# Patient Record
Sex: FEMALE | Race: WHITE | Employment: STUDENT | URBAN - METROPOLITAN AREA
[De-identification: names, ages, dates, MRNs, and addresses within clinical notes are randomized per-mention and may not be internally consistent; named-entity substitution may affect disease eponyms.]

---

## 2018-05-02 ENCOUNTER — HOSPITAL ENCOUNTER (OUTPATIENT)
Age: 23
Discharge: HOME | End: 2018-05-02
Attending: FAMILY MEDICINE
Payer: COMMERCIAL

## 2018-05-02 ENCOUNTER — OFFICE VISIT (OUTPATIENT)
Dept: FAMILY MEDICINE CLINIC | Facility: CLINIC | Age: 23
End: 2018-05-02

## 2018-05-02 VITALS
RESPIRATION RATE: 16 BRPM | OXYGEN SATURATION: 98 % | WEIGHT: 169 LBS | BODY MASS INDEX: 24.2 KG/M2 | HEIGHT: 70 IN | TEMPERATURE: 98 F | HEART RATE: 76 BPM | DIASTOLIC BLOOD PRESSURE: 68 MMHG | SYSTOLIC BLOOD PRESSURE: 118 MMHG

## 2018-05-02 VITALS
DIASTOLIC BLOOD PRESSURE: 63 MMHG | RESPIRATION RATE: 18 BRPM | SYSTOLIC BLOOD PRESSURE: 109 MMHG | TEMPERATURE: 98 F | HEART RATE: 98 BPM | OXYGEN SATURATION: 98 %

## 2018-05-02 DIAGNOSIS — H16.002 CORNEAL ULCERATION, LEFT: Primary | ICD-10-CM

## 2018-05-02 DIAGNOSIS — Z02.9 ADMINISTRATIVE ENCOUNTER: Primary | ICD-10-CM

## 2018-05-02 DIAGNOSIS — H10.31 ACUTE BACTERIAL CONJUNCTIVITIS OF RIGHT EYE: ICD-10-CM

## 2018-05-02 PROCEDURE — 99213 OFFICE O/P EST LOW 20 MIN: CPT

## 2018-05-02 PROCEDURE — 99202 OFFICE O/P NEW SF 15 MIN: CPT

## 2018-05-02 RX ORDER — KETOROLAC TROMETHAMINE 4 MG/ML
1 SOLUTION/ DROPS OPHTHALMIC 4 TIMES DAILY
Qty: 1 BOTTLE | Refills: 0 | Status: SHIPPED | OUTPATIENT
Start: 2018-05-02 | End: 2018-05-09

## 2018-05-02 RX ORDER — TETRACAINE HYDROCHLORIDE 5 MG/ML
1 SOLUTION OPHTHALMIC ONCE
Status: COMPLETED | OUTPATIENT
Start: 2018-05-02 | End: 2018-05-02

## 2018-05-02 RX ORDER — POLYMYXIN B SULFATE AND TRIMETHOPRIM 1; 10000 MG/ML; [USP'U]/ML
1 SOLUTION OPHTHALMIC
Qty: 10 ML | Refills: 0 | Status: SHIPPED | OUTPATIENT
Start: 2018-05-02 | End: 2018-05-07

## 2018-05-02 NOTE — ED INITIAL ASSESSMENT (HPI)
Slept with the contact lens on last night and woke up with left eye pain and redness. Unable to keep left eye open.

## 2018-05-02 NOTE — ED PROVIDER NOTES
Patient Seen in: [facility] In [facility]    History   Patient presents with:  Eye Pain    Stated Complaint: totay eye pain & redness after removing contact lens    HPI    45-year-old female was sent in from the walk-in care secondary to left Left eye exhibits no exudate and no hordeolum. No foreign body present in the left eye. Right conjunctiva is injected. Right conjunctiva has no hemorrhage. Left conjunctiva is injected. Left conjunctiva has no hemorrhage.    2 Drops of Tetracaine placed in

## 2018-05-02 NOTE — PROGRESS NOTES
Peter Gutierres is a 23year old female who presents to [facility] with c/o left eye pain. Accompanied by: father  After triage, higher acuity of care was recommended to Peter Gutierres today.    Rationale: Pt with left eye pain, photophobia, foreign body sensation

## 2018-10-21 ENCOUNTER — OFFICE VISIT (OUTPATIENT)
Dept: FAMILY MEDICINE CLINIC | Facility: CLINIC | Age: 23
End: 2018-10-21
Payer: COMMERCIAL

## 2018-10-21 VITALS
TEMPERATURE: 100 F | WEIGHT: 165.81 LBS | BODY MASS INDEX: 24 KG/M2 | SYSTOLIC BLOOD PRESSURE: 102 MMHG | OXYGEN SATURATION: 98 % | DIASTOLIC BLOOD PRESSURE: 72 MMHG | RESPIRATION RATE: 16 BRPM | HEART RATE: 71 BPM

## 2018-10-21 DIAGNOSIS — J02.9 SORE THROAT: Primary | ICD-10-CM

## 2018-10-21 PROCEDURE — 99213 OFFICE O/P EST LOW 20 MIN: CPT | Performed by: PHYSICIAN ASSISTANT

## 2018-10-21 PROCEDURE — 87081 CULTURE SCREEN ONLY: CPT | Performed by: PHYSICIAN ASSISTANT

## 2018-10-21 PROCEDURE — 87880 STREP A ASSAY W/OPTIC: CPT | Performed by: PHYSICIAN ASSISTANT

## 2018-10-21 PROCEDURE — 86308 HETEROPHILE ANTIBODY SCREEN: CPT | Performed by: PHYSICIAN ASSISTANT

## 2018-10-21 RX ORDER — CLINDAMYCIN PHOSPHATE AND TRETINOIN 10; .25 MG/G; MG/G
GEL TOPICAL
Refills: 1 | COMMUNITY
Start: 2018-08-13 | End: 2019-09-05 | Stop reason: ALTCHOICE

## 2018-10-21 NOTE — PROGRESS NOTES
CHIEF COMPLAINT:   Patient presents with:  Sore Throat: hard to swallow x2wks      HPI:   Nathaneil Runner is a 26year old female presents to clinic with symptoms of sore throat for two weeks. Pain is worse at night. Worse on the left side. Mild sweats.  No malodorous. No uvular deviation. No drooling. NECK: supple, non-tender  LUNGS: clear to auscultation bilaterally, no wheezes or rhonchi. Breathing is non labored. CARDIO: RRR without murmur  LYMPH: no anterior cervical. no submandibular lymphadenopathy.

## 2018-10-21 NOTE — PATIENT INSTRUCTIONS
Zyrtec OTC   Ibuprofen 600 maurilio every 6-8 hours   Fluids   Please follow up with PCP if no improvement or if symptoms worsen

## 2018-10-24 ENCOUNTER — TELEPHONE (OUTPATIENT)
Dept: FAMILY MEDICINE CLINIC | Facility: CLINIC | Age: 23
End: 2018-10-24

## 2018-10-24 NOTE — TELEPHONE ENCOUNTER
Phoned pt at [facility] approved number with info regarding throat culture results:Negative. Clinic hours and answering service phone number left on voicemail.

## 2019-02-13 PROCEDURE — 88175 CYTOPATH C/V AUTO FLUID REDO: CPT | Performed by: OBSTETRICS & GYNECOLOGY

## 2019-09-01 ENCOUNTER — APPOINTMENT (OUTPATIENT)
Dept: GENERAL RADIOLOGY | Age: 24
End: 2019-09-01
Attending: PHYSICIAN ASSISTANT
Payer: COMMERCIAL

## 2019-09-01 ENCOUNTER — HOSPITAL ENCOUNTER (OUTPATIENT)
Age: 24
Discharge: HOME | End: 2019-09-01
Payer: COMMERCIAL

## 2019-09-01 VITALS
RESPIRATION RATE: 16 BRPM | OXYGEN SATURATION: 98 % | DIASTOLIC BLOOD PRESSURE: 67 MMHG | SYSTOLIC BLOOD PRESSURE: 112 MMHG | HEART RATE: 83 BPM | TEMPERATURE: 98 F

## 2019-09-01 DIAGNOSIS — T83.9XXA COMPLICATION OF INTRAUTERINE DEVICE (IUD), UNSPECIFIED COMPLICATION, INITIAL ENCOUNTER (HCC): Primary | ICD-10-CM

## 2019-09-01 LAB — POCT URINE PREGNANCY: NEGATIVE

## 2019-09-01 PROCEDURE — 99214 OFFICE O/P EST MOD 30 MIN: CPT

## 2019-09-01 PROCEDURE — 74018 RADEX ABDOMEN 1 VIEW: CPT | Performed by: PHYSICIAN ASSISTANT

## 2019-09-01 PROCEDURE — 81025 URINE PREGNANCY TEST: CPT | Performed by: PHYSICIAN ASSISTANT

## 2019-09-01 PROCEDURE — 99213 OFFICE O/P EST LOW 20 MIN: CPT

## 2019-09-01 NOTE — ED PROVIDER NOTES
Patient Seen in: [facility] In [facility]    History   Patient presents with:  Eval-G (genital)    Stated Complaint: GYNE ISSUE    HPI    22-year-old female here with complaint of IUD in wrong position.   Patient reports that she could palpate She appears well-developed and well-nourished. HENT:   Head: Normocephalic.    Right Ear: External ear normal.   Left Ear: External ear normal.   Nose: Nose normal.   Mouth/Throat: Oropharynx is clear and moist.   Eyes: Pupils are equal, round, and reacti patient does not have an acute abdomen.   We do not have ultrasound at this time he is comfortable with her following up with her GYN Dr. arriaza for examination and possible removal.         MDM   Clinical Impression: IUD placement  Course of Treatment: [place]

## 2019-12-06 ENCOUNTER — OFFICE VISIT (OUTPATIENT)
Dept: FAMILY MEDICINE CLINIC | Facility: CLINIC | Age: 24
End: 2019-12-06
Payer: COMMERCIAL

## 2019-12-06 VITALS
DIASTOLIC BLOOD PRESSURE: 74 MMHG | TEMPERATURE: 98 F | HEART RATE: 86 BPM | BODY MASS INDEX: 23.39 KG/M2 | OXYGEN SATURATION: 98 % | HEIGHT: 70 IN | RESPIRATION RATE: 20 BRPM | WEIGHT: 163.38 LBS | SYSTOLIC BLOOD PRESSURE: 116 MMHG

## 2019-12-06 DIAGNOSIS — R39.9 SYMPTOMS OF URINARY TRACT INFECTION: Primary | ICD-10-CM

## 2019-12-06 PROCEDURE — 99213 OFFICE O/P EST LOW 20 MIN: CPT | Performed by: NURSE PRACTITIONER

## 2019-12-06 PROCEDURE — 81003 URINALYSIS AUTO W/O SCOPE: CPT | Performed by: NURSE PRACTITIONER

## 2019-12-06 PROCEDURE — 87086 URINE CULTURE/COLONY COUNT: CPT | Performed by: NURSE PRACTITIONER

## 2019-12-06 RX ORDER — PHENAZOPYRIDINE HYDROCHLORIDE 200 MG/1
200 TABLET, FILM COATED ORAL 3 TIMES DAILY PRN
Qty: 10 TABLET | Refills: 0 | Status: SHIPPED | OUTPATIENT
Start: 2019-12-06

## 2019-12-06 NOTE — PROGRESS NOTES
Pippa Guzman is a 27year old female. CHIEF COMPLAINT:   No chief complaint on file. HPI:   Patient presents with symptoms of UTI. Reports 2-3 day history of urinary frequency and dysuria. Patient reports hematuria, but menses is due any day.  Luis Gaming Blood Urine moderate Negative    PH Urine 6 4.5 - 8.0    Protein Urine 30 Negative/Trace mg/dL    Urobilinogen Urine 0.2 0.0 - 1.9 mg/dL    Nitrite Urine negative Negative    Leukocyte Esterase Urine negative Negative    APPEARANCE cloudy Clear    Color U · Having to urinate more often than usual  · Urgent need to urinate  · Only a small amount of urine comes out  · Blood in urine  · Abdominal discomfort. This is usually in the lower abdomen above the pubic bone.   · Cloudy urine  · Strong- or bad-smelling u · If you are given phenazopydridine to reduce burning with urination, it will cause your urine to become a bright orange color. This can stain clothing.   Care and prevention  These self-care steps can help prevent future infections:  · Drink plenty of flui · Repeated vomiting, or unable to keep medicine down  · Weakness or dizziness  · Vaginal discharge  · Pain, redness, or swelling in the outer vaginal area (labia)  Date Last Reviewed: 10/1/2016  © 0710-8862 [employer].  [facility]

## (undated) NOTE — LETTER
Date & Time: 5/2/2018, 9:57 AM  Patient: Brandy Evans  Encounter Provider(s):    Kasey Woo MD       To Whom It May Concern:    Brandy Evans was seen and treated in our department on 5/2/2018. She should not return to work until 5/4/2018.     If

## (undated) NOTE — LETTER
Date & Time: 9/1/2019, 10:30 AM  Patient: Nataly Wheeler  Encounter Provider(s):    DALILA Khalil       To Whom It May Concern:    Nataly Wheeler was seen and treated in our department on 9/1/2019.   We will need to miss a day of work in the next w